# Patient Record
Sex: MALE | Race: WHITE | NOT HISPANIC OR LATINO | Employment: OTHER | URBAN - METROPOLITAN AREA
[De-identification: names, ages, dates, MRNs, and addresses within clinical notes are randomized per-mention and may not be internally consistent; named-entity substitution may affect disease eponyms.]

---

## 2017-01-18 DIAGNOSIS — F41.9 ANXIETY: Primary | ICD-10-CM

## 2017-01-18 RX ORDER — LORAZEPAM 1 MG/1
1 TABLET ORAL
Qty: 30 TABLET | Refills: 0 | Status: SHIPPED | OUTPATIENT
Start: 2017-01-18 | End: 2017-02-16

## 2017-01-18 NOTE — TELEPHONE ENCOUNTER
Hello,  last fill date:12-  Last quantity:30    Thank You,  Regi Avalos  Pharmacy Technician  [facility]  [phone]

## 2017-02-16 DIAGNOSIS — F41.9 ANXIETY: ICD-10-CM

## 2017-02-16 RX ORDER — LORAZEPAM 1 MG/1
1 TABLET ORAL
Qty: 30 TABLET | Refills: 0 | Status: CANCELLED | OUTPATIENT
Start: 2017-02-16

## 2017-02-16 NOTE — TELEPHONE ENCOUNTER
Hello,  last fill date:01-  Last quantity:30    Thank You,  Regi Avalos  Pharmacy Technician  [facility]  [phone]

## 2017-03-20 DIAGNOSIS — F41.9 ANXIETY: ICD-10-CM

## 2017-03-20 RX ORDER — LORAZEPAM 1 MG/1
1 TABLET ORAL
Qty: 30 TABLET | Refills: 0 | Status: SHIPPED | OUTPATIENT
Start: 2017-03-20 | End: 2017-04-17

## 2017-03-20 NOTE — TELEPHONE ENCOUNTER
Hello,  last fill date:02-17-17  Last quantity:30    Thank You,  Regi Avalos  Pharmacy Technician  [facility]  [phone]

## 2017-04-17 ENCOUNTER — TELEPHONE (OUTPATIENT)
Dept: FAMILY MEDICINE | Facility: CLINIC | Age: 62
End: 2017-04-17

## 2017-04-17 DIAGNOSIS — I25.10 CORONARY ARTERY DISEASE INVOLVING NATIVE CORONARY ARTERY OF NATIVE HEART WITHOUT ANGINA PECTORIS: Chronic | ICD-10-CM

## 2017-04-17 DIAGNOSIS — F41.9 ANXIETY: ICD-10-CM

## 2017-04-17 RX ORDER — LORAZEPAM 1 MG/1
1 TABLET ORAL
Qty: 30 TABLET | Refills: 0 | Status: SHIPPED | OUTPATIENT
Start: 2017-04-17 | End: 2017-05-22

## 2017-04-17 RX ORDER — METOPROLOL TARTRATE 25 MG/1
25 TABLET, FILM COATED ORAL 2 TIMES DAILY
Qty: 60 TABLET | Refills: 3 | Status: SHIPPED | OUTPATIENT
Start: 2017-04-17 | End: 2017-08-21

## 2017-04-17 NOTE — TELEPHONE ENCOUNTER
Inform that a clinic visit is due in May 2017 for a six month follow up and medication renewals.    Gerald Rowley MD  Please close encounter when call/work completed.

## 2017-04-17 NOTE — TELEPHONE ENCOUNTER
I have attempted to call the pt with the following message. I left message for pt to call back. I will call back another time. Please inform pt of message when call is returned. Justina Haas CMA ([facility])

## 2017-04-17 NOTE — TELEPHONE ENCOUNTER
Hello,  last fill date:03-  Last quantity:60    Thank You,  Regi Avalos  Pharmacy Technician  [facility]  [phone]

## 2017-04-17 NOTE — TELEPHONE ENCOUNTER
Hello,  last fill date:03-  Last quantity:30    Thank You,  Regi Avalos  Pharmacy Technician  [facility]  [phone]

## 2017-04-17 NOTE — PROGRESS NOTES
SUBJECTIVE:                                                    Jermaine Quiroga is a 62 year old male who presents to clinic today for the following health issues:    Hyperlipidemia Follow-Up      Rate your low fat/cholesterol diet?: fair    Taking statin?  Yes, no muscle aches from statin    Other lipid medications/supplements?:  none       Depression and Anxiety Follow-Up--    Previous episodes of depression have resolved as they were situational based on his severe injuries from an accident and septic endocarditis. He is back at work and depression. No longer a factor. He has some mild chronic anxiety for which he utilizes Lorazepam 1 mg at bedtime which helps control anxiety and allow sleep. He does not use more than 1 tablet per day. No antidepressants were needed. SSRI therapy is not currently being used.      Status since last visit: No change    Other associated symptoms:None    Complicating factors:     Significant life event: No     Current substance abuse: None    PHQ-9 SCORE 10/13/2016 11/30/2016 4/26/2017   Total Score - - -   Total Score 2 1 1     ISAURA-7 SCORE 10/13/2016 11/30/2016 4/26/2017   Total Score - - -   Total Score 0 0 0        PHQ-9  English      PHQ-9   Any Language     GAD7       Amount of exercise or physical activity: None    Problems taking medications regularly: No    Medication side effects: none    Diet: regular (no restrictions)      Vascular Disease Follow-up:  Coronary Artery Disease (CAD)  Stable without symptoms following his 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    Chest pain or pressure, left side neck or arm pain: No    Shortness of breath/increased sweats/nausea with exertion: No    Pain in calves walking 1-2 blocks: No    Worsened or new symptoms since last visit: No    Nitroglycerin use: no    Daily aspirin use: Yes     COPD Follow-Up--    Currently he is on an unknown inhaler under research study which has helped him greatly. He improves  his FEV1 significantly when tested. Previously was on Spiriva.    Symptoms are currently: stable    Current fatigue or dyspnea with ambulation: stable     Shortness of breath: stable    Increased or change in Cough/Sputum: No    Fever(s): No    Baseline ambulation without stopping to rest few blocks. Able to walk up one flights of stairs without stopping to rest.    Any ER/UC or hospital admissions since your last visit? No     History   Smoking Status     Former Smoker     Packs/day: 2.50     Years: 40.00     Types: Cigarettes     Quit date: 2/16/2008   Smokeless Tobacco     Never Used     Comment: heavy smoker for 40 years smoked 2.5 packs a day     Lab Results   Component Value Date    FEV1 1.74 10/30/2013    FEV1 1.60 10/30/2013    QZE8GCK 42% 10/30/2013    TXT0MNA 39 10/30/2013          Problem list and histories reviewed & adjusted, as indicated.  Additional history: as documented        Reviewed and updated as needed this visit by clinical staff  Tobacco  Allergies  Med Hx  Surg Hx  Fam Hx  Soc Hx      Reviewed and updated as needed this visit by Provider  Allergies         ROS:  C: NEGATIVE for fever, chills, change in weight  I: NEGATIVE for worrisome rashes, moles or lesions  INTEGUMENTARY/SKIN: Past history of psoriasis  E: NEGATIVE for vision changes or irritation  E/M: NEGATIVE for ear, mouth and throat problems  RESP:as above  B: NEGATIVE for masses, tenderness or discharge  CV: NEGATIVE for chest pain, palpitations or peripheral edema  GI: NEGATIVE for nausea, abdominal pain, heartburn, or change in bowel habits  : NEGATIVE for frequency, dysuria, or hematuria  MUSCULOSKELETAL: Ongoing chronic pain in his previous total knee arthroplasty. Continued pain in his right ankle post fracture trauma from the accident. He wears a brace.  N: NEGATIVE for weakness, dizziness or paresthesias  E: NEGATIVE for temperature intolerance, skin/hair changes  H: NEGATIVE for bleeding problems  P: NEGATIVE for  "changes in mood or affect  PSYCHIATRIC: Reasonable benefit and control of insomnia related to anxiety with the use of Lorazepam 1 mg at bedtime.    Urine drug screen in November 2016 showed some presence of THC. Patient had ingested some marijuana containing food at [facility]. Denies daily use of drugs.    OBJECTIVE:                                                    /82 (BP Location: Left arm, Patient Position: Chair, Cuff Size: Adult Large)  Pulse 114  Temp 99.6  F (37.6  C) (Temporal)  Resp 20  Ht 5' 7.5\" (1.715 m)  Wt 220 lb 4.8 oz (99.9 kg)  SpO2 96%  BMI 33.99 kg/m2  Body mass index is 33.99 kg/(m^2).  GENERAL: alert, no distress, cooperative and over weight  EYES: Eyes grossly normal to inspection, extraocular movements - intact, and PERRL  HENT: ear canals- normal; TMs- normal; Nose- normal; Mouth- no ulcers, no lesions  NECK: no tenderness, no adenopathy, no asymmetry, no masses, no stiffness; thyroid- normal to palpation  RESP: lungs clear to auscultation - no rales, no rhonchi, no wheezes  CV: regular rates and rhythm, normal S1 S2, no S3 or S4 and no murmur, no click or rub -  ABDOMEN: soft, no tenderness, no  hepatosplenomegaly, no masses, normal bowel sounds  MS: Total left knee arthroplasty. No edema of the lower extremities  SKIN: no suspicious lesions, no rashes  NEURO: strength and tone- normal, sensory exam- grossly normal, mentation- intact, speech- normal, reflexes- symmetric  BACK: no CVA tenderness, no paralumbar tenderness  PSYCH: Alert and oriented times 3; speech- coherent , normal rate and volume; able to articulate logical thoughts, able to abstract reason, no tangential thoughts, no hallucinations or delusions, affect- normal  LYMPHATICS: ant. cervical- normal, post. cervical- normal, axillary- normal, supraclavicular- normal, inguinal- normal    Diagnostic test results:  Diagnostic Test Results:  none      ASSESSMENT/PLAN:                                                  "   1. Chronic obstructive pulmonary disease, unspecified COPD type (H)   Stable at present. He is continued with inhalers through the research study he is involved in.  - COPD ACTION PLAN    2. Anxiety   Renew lorazepam 1 mg at bedtime on a monthly basis in follow-up in 6 months.    3. Coronary artery disease involving native coronary artery of native heart without angina pectoris   Stable with no recurrent symptoms    4. LEXY-Moderately Severe (AHI 25; LSat 68%)    Continues on current therapy    5. Hyperlipidemia LDL goal <100   Continues on statin therapy.      Follow up with Provider - Follow-up in 6 months or as needed.   See Patient Instructions    The patient understood the rational for the diagnosis and treatment plan. All questions were answered to best of my ability and the patient's satisfaction.      Gerald Rowley MD  [facility]

## 2017-04-17 NOTE — TELEPHONE ENCOUNTER
Lorazepam refilled today--    Needing a clinic visit in one month for six month follow up.    Gerald Rowley MD

## 2017-04-26 ENCOUNTER — OFFICE VISIT (OUTPATIENT)
Dept: FAMILY MEDICINE | Facility: CLINIC | Age: 62
End: 2017-04-26
Payer: COMMERCIAL

## 2017-04-26 VITALS
BODY MASS INDEX: 33.39 KG/M2 | HEART RATE: 114 BPM | HEIGHT: 68 IN | DIASTOLIC BLOOD PRESSURE: 82 MMHG | SYSTOLIC BLOOD PRESSURE: 138 MMHG | TEMPERATURE: 99.6 F | WEIGHT: 220.3 LBS | RESPIRATION RATE: 20 BRPM | OXYGEN SATURATION: 96 %

## 2017-04-26 DIAGNOSIS — I25.10 CORONARY ARTERY DISEASE INVOLVING NATIVE CORONARY ARTERY OF NATIVE HEART WITHOUT ANGINA PECTORIS: Chronic | ICD-10-CM

## 2017-04-26 DIAGNOSIS — E78.5 HYPERLIPIDEMIA LDL GOAL <100: ICD-10-CM

## 2017-04-26 DIAGNOSIS — J44.9 CHRONIC OBSTRUCTIVE PULMONARY DISEASE, UNSPECIFIED COPD TYPE (H): Primary | Chronic | ICD-10-CM

## 2017-04-26 DIAGNOSIS — G47.33 OSA (OBSTRUCTIVE SLEEP APNEA): Chronic | ICD-10-CM

## 2017-04-26 DIAGNOSIS — F41.9 ANXIETY: ICD-10-CM

## 2017-04-26 PROCEDURE — 99214 OFFICE O/P EST MOD 30 MIN: CPT | Performed by: FAMILY MEDICINE

## 2017-04-26 ASSESSMENT — PATIENT HEALTH QUESTIONNAIRE - PHQ9: 5. POOR APPETITE OR OVEREATING: NOT AT ALL

## 2017-04-26 ASSESSMENT — ANXIETY QUESTIONNAIRES
IF YOU CHECKED OFF ANY PROBLEMS ON THIS QUESTIONNAIRE, HOW DIFFICULT HAVE THESE PROBLEMS MADE IT FOR YOU TO DO YOUR WORK, TAKE CARE OF THINGS AT HOME, OR GET ALONG WITH OTHER PEOPLE: NOT DIFFICULT AT ALL
5. BEING SO RESTLESS THAT IT IS HARD TO SIT STILL: NOT AT ALL
2. NOT BEING ABLE TO STOP OR CONTROL WORRYING: NOT AT ALL
1. FEELING NERVOUS, ANXIOUS, OR ON EDGE: NOT AT ALL
3. WORRYING TOO MUCH ABOUT DIFFERENT THINGS: NOT AT ALL
7. FEELING AFRAID AS IF SOMETHING AWFUL MIGHT HAPPEN: NOT AT ALL
GAD7 TOTAL SCORE: 0
6. BECOMING EASILY ANNOYED OR IRRITABLE: NOT AT ALL

## 2017-04-26 ASSESSMENT — PAIN SCALES - GENERAL: PAINLEVEL: MODERATE PAIN (5)

## 2017-04-26 NOTE — NURSING NOTE
"Chief Complaint   Patient presents with     Recheck Medication     Panel Management     Mychart, ISAURA, COPD AP, PHQ,       Initial /82 (BP Location: Left arm, Patient Position: Chair, Cuff Size: Adult Large)  Pulse 114  Temp 99.6  F (37.6  C) (Temporal)  Resp 20  Ht 5' 7.5\" (1.715 m)  Wt 220 lb 4.8 oz (99.9 kg)  SpO2 96%  BMI 33.99 kg/m2 Estimated body mass index is 33.99 kg/(m^2) as calculated from the following:    Height as of this encounter: 5' 7.5\" (1.715 m).    Weight as of this encounter: 220 lb 4.8 oz (99.9 kg).  Medication Reconciliation: complete  Justina Haas CMA ([facility])    "

## 2017-04-26 NOTE — LETTER
My COPD Action Plan   Name: Jermaine Quiroga    YOB: 1955   Date: 4/26/2017    My doctor: Gerald Rowley MD   My clinic: [address], [address]  [place] [id number] [phone]  My Controller Medicine: Study medication --Spiriva in the past   Dose:      My Rescue Medicine: Albuterol (Proair/Ventolin/Proventil) inhaler   Dose: As needed every 4 hours     My Flare Up Medicine: Prednisone   Dose: As directed  My COPD Severity: Moderate = FeV1 < 79% -50%     Use of Oxygen: Oxygen Not Prescribed         GREEN ZONE       Doing well today      Usual level of activity and exercise    Usual amount of cough and mucus    No shortness of breath    Usual level of health (thinking clearly, sleeping well, feel like eating) Actions:      Take daily medicines    Use oxygen as prescribed    Follow regular exercise and diet plan    Avoid cigarette smoke and other irritants that harm the lungs           YELLOW ZONE          Having a bad day or flare up      Short of breath more than usual    A lot more sputum (mucus) than usual    Sputum looks yellow, green, tan, brown or bloody    More coughing or wheezing    Fever or chills    Less energy; trouble completing activities    Trouble thinking or focusing    Using quick relief inhaler or nebulizer more often    Poor sleep; symptoms wake me up    Do not feel like eating Actions:      Get plenty of rest    Take daily medicines    Use quick relief inhaler every 4 hours    If you use oxygen, call you doctor to see if you should adjust your oxygen    Do breathing exercises or other things to help you relax    Let a loved one, friend or neighbor know you are feeling worse    Call your care team if you have 2 or more symptoms.  Start taking steroids or antibiotics if directed by your care team           RED ZONE       Need medical care now      Severe shortness of breath (feel you can't breathe)    Fever, chills    Not enough breath  to do any activity    Trouble coughing up mucus, walking or talking    Blood in mucus    Frequent coughing   Rescue medicines are not working    Not able to sleep because of breathing    Feel confused or drowsy    Chest pain    Actions:      Call your health care team.  If you cannot reach your care team, call 911 or go to the emergency room.        Electronically signed by: Gerald Rowley, April 26, 2017  Annual Reminders:  Meet with Care Team, Flu Shot every Fall and Pneumonia Shot at least once  Pharmacy:    [facility] [place] - [address] [place] [address] [facility] - [place], [place] - [address], [id number]

## 2017-04-26 NOTE — PATIENT INSTRUCTIONS
These are new changes to your current plan of care based on today's visit:    Medications stopped    Medications to be started    Change dose of this medication None   New treatments        Follow up appointments:    1.  FOLLOW UP WITH YOUR PRIMARY CARE PROVIDER: 6 month(s) for clinic visit    Gerald Rowley MD

## 2017-04-26 NOTE — MR AVS SNAPSHOT
After Visit Summary   4/26/2017    Jermaine Quiroga    MRN: [id number]           Patient Information     Date Of Birth          1955        Visit Information        Provider Department      4/26/2017 3:20 PM Gerald Rowley MD [facility]        Today's Diagnoses     Chronic obstructive pulmonary disease, unspecified COPD type (H)    -  1    Anxiety        Coronary artery disease involving native coronary artery of native heart without angina pectoris        LEXY-Moderately Severe (AHI 25; LSat 68%)        Hyperlipidemia LDL goal <100          Care Instructions    These are new changes to your current plan of care based on today's visit:    Medications stopped    Medications to be started    Change dose of this medication None   New treatments        Follow up appointments:    1.  FOLLOW UP WITH YOUR PRIMARY CARE PROVIDER: 6 month(s) for clinic visit    Gerald Rowley MD              Follow-ups after your visit        Follow-up notes from your care team     Return in about 6 months (around 10/26/2017) for Routine Visit.      Who to contact     If you have questions or need follow up information about today's clinic visit or your schedule please contact [facility] directly at [phone].  Normal or non-critical lab and imaging results will be communicated to you by [facility], letter or phone within 4 business days after the clinic has received the results. If you do not hear from us within 7 days, please contact the clinic through [facility] or phone. If you have a critical or abnormal lab result, we will notify you by phone as soon as possible.  Submit refill requests through [facility] or call your pharmacy and they will forward the refill request to us. Please allow 3 business days for your refill to be completed.          Additional Information About Your Visit        [facility] Information     [facility] lets you send messages to your doctor, view your test results, renew  "your prescriptions, schedule appointments and more. To sign up, go to [email or web address] . Click on \"Log in\" on the left side of the screen, which will take you to the Welcome page. Then click on \"Sign up Now\" on the right side of the page.     You will be asked to enter the access code listed below, as well as some personal information. Please follow the directions to create your username and password.     Your access code is: [email or web address]  Expires: 2017  3:42 PM     Your access code will  in 90 days. If you need help or a new code, please call your [facility] clinic or [phone].        Care EveryWhere ID     This is your Care EveryWhere ID. This could be used by other organizations to access your [facility] medical records  [id number]        Your Vitals Were     Pulse Temperature Respirations Height Pulse Oximetry BMI (Body Mass Index)    114 99.6  F (37.6  C) (Temporal) 20 5' 7.5\" (1.715 m) 96% 33.99 kg/m2       Blood Pressure from Last 3 Encounters:   17 138/82   16 130/86   16 135/75    Weight from Last 3 Encounters:   17 220 lb 4.8 oz (99.9 kg)   16 211 lb (95.7 kg)   16 213 lb 8 oz (96.8 kg)              We Performed the Following     COPD ACTION PLAN        Primary Care Provider Office Phone # Fax #    Gerald Rowley [phone]       [facility] [address] [facility] [address]        Thank you!     Thank you for choosing [facility]  for your care. Our goal is always to provide you with excellent care. Hearing back from our patients is one way we can continue to improve our services. Please take a few minutes to complete the written survey that you may receive in the mail after your visit with us. Thank you!             Your Updated Medication List - Protect others around you: Learn how to safely use, store and throw away your medicines at www.disposemymeds.org.          This list is accurate as of: " 4/26/17  3:42 PM.  Always use your most recent med list.                   Brand Name Dispense Instructions for use    albuterol 108 (90 BASE) MCG/ACT Inhaler    PROAIR HFA/PROVENTIL HFA/VENTOLIN HFA    1 Inhaler    Inhale 2 puffs into the lungs every 4 hours as needed for shortness of breath / dyspnea       BABY ASPIRIN PO          LORazepam 1 MG tablet    ATIVAN    30 tablet    Take 1 tablet (1 mg) by mouth nightly as needed for anxiety       metoprolol 25 MG tablet    LOPRESSOR    60 tablet    Take 1 tablet (25 mg) by mouth 2 times daily       order for DME      Auto-CPAP: Max 10 cm H2O Min 10 cm H2O  Continuous  Lifetime need and heated humidity.   Ramp set to patient's preference. Mask of choice.       simvastatin 20 MG tablet    ZOCOR    90 tablet    Take 1 tablet (20 mg) by mouth At Bedtime       tiotropium 18 MCG capsule    SPIRIVA HANDIHALER    30 capsule    Inhale contents of one capsule daily.       VITAMIN D3 PO      Take 1,000 Units by mouth daily

## 2017-04-27 ASSESSMENT — ANXIETY QUESTIONNAIRES: GAD7 TOTAL SCORE: 0

## 2017-04-27 ASSESSMENT — PATIENT HEALTH QUESTIONNAIRE - PHQ9: SUM OF ALL RESPONSES TO PHQ QUESTIONS 1-9: 1

## 2017-05-22 DIAGNOSIS — F41.9 ANXIETY: ICD-10-CM

## 2017-05-22 NOTE — TELEPHONE ENCOUNTER
Hello,  last fill date:04-  Last quantity:30    Thank You,  Regi Avalos  Pharmacy Technician  [facility]  [phone]

## 2017-05-23 RX ORDER — LORAZEPAM 1 MG/1
1 TABLET ORAL
Qty: 30 TABLET | Refills: 0 | Status: SHIPPED | OUTPATIENT
Start: 2017-05-23 | End: 2017-06-19

## 2017-06-19 DIAGNOSIS — F41.9 ANXIETY: ICD-10-CM

## 2017-06-19 RX ORDER — LORAZEPAM 1 MG/1
1 TABLET ORAL
Qty: 30 TABLET | Refills: 0 | Status: SHIPPED | OUTPATIENT
Start: 2017-06-19 | End: 2017-07-19

## 2017-06-19 NOTE — TELEPHONE ENCOUNTER
Hello,  last fill date:05-  Last quantity:30    Thank You,  Regi Avalos  Pharmacy Technician  [facility]  [phone]

## 2017-07-19 DIAGNOSIS — F41.9 ANXIETY: ICD-10-CM

## 2017-07-19 RX ORDER — LORAZEPAM 1 MG/1
1 TABLET ORAL
Qty: 30 TABLET | Refills: 0 | Status: SHIPPED | OUTPATIENT
Start: 2017-07-19 | End: 2017-08-21

## 2017-07-19 NOTE — TELEPHONE ENCOUNTER
Hello,  last fill date:06-  Last quantity:30    Thank You,  Regi Avalos  Pharmacy Technician  [facility]  [phone]

## 2017-08-21 DIAGNOSIS — I25.10 CORONARY ARTERY DISEASE INVOLVING NATIVE CORONARY ARTERY OF NATIVE HEART WITHOUT ANGINA PECTORIS: Chronic | ICD-10-CM

## 2017-08-21 DIAGNOSIS — F41.9 ANXIETY: ICD-10-CM

## 2017-08-21 RX ORDER — METOPROLOL TARTRATE 25 MG/1
25 TABLET, FILM COATED ORAL 2 TIMES DAILY
Qty: 60 TABLET | Refills: 3 | Status: SHIPPED | OUTPATIENT
Start: 2017-08-21 | End: 2017-12-19

## 2017-08-21 RX ORDER — LORAZEPAM 1 MG/1
1 TABLET ORAL
Qty: 30 TABLET | Refills: 0 | Status: SHIPPED | OUTPATIENT
Start: 2017-08-21 | End: 2017-09-18

## 2017-08-21 NOTE — TELEPHONE ENCOUNTER
Hello,  last fill date:07-  Last quantity:60    Thank You,  Regi Avalos  Pharmacy Technician  [facility]  [phone]

## 2017-09-11 DIAGNOSIS — I25.10 CORONARY ARTERY DISEASE INVOLVING NATIVE CORONARY ARTERY OF NATIVE HEART WITHOUT ANGINA PECTORIS: ICD-10-CM

## 2017-09-11 RX ORDER — SIMVASTATIN 20 MG
20 TABLET ORAL AT BEDTIME
Qty: 90 TABLET | Refills: 4 | Status: SHIPPED | OUTPATIENT
Start: 2017-09-11

## 2017-09-11 NOTE — TELEPHONE ENCOUNTER
Hello,  last fill date:06-  Last quantity:90    Thank You,  Regi Avalos  Pharmacy Technician  [facility]  [phone]

## 2017-09-18 DIAGNOSIS — F41.9 ANXIETY: ICD-10-CM

## 2017-09-18 RX ORDER — LORAZEPAM 1 MG/1
1 TABLET ORAL
Qty: 30 TABLET | Refills: 0 | Status: SHIPPED | OUTPATIENT
Start: 2017-09-18 | End: 2017-10-19

## 2017-09-18 NOTE — TELEPHONE ENCOUNTER
Hello,  last fill date:08-  Last quantity:30    Thank You,  Regi Avalos  Pharmacy Technician  [facility]  [phone]

## 2017-10-19 DIAGNOSIS — F41.9 ANXIETY: ICD-10-CM

## 2017-10-19 RX ORDER — LORAZEPAM 1 MG/1
1 TABLET ORAL
Qty: 30 TABLET | Refills: 0 | Status: SHIPPED | OUTPATIENT
Start: 2017-10-19 | End: 2017-11-20

## 2017-10-19 NOTE — TELEPHONE ENCOUNTER
Hello,  last fill date:09-  Last quantity:30    Thank You,  Regi Avalos  Pharmacy Technician  [facility]  [phone]

## 2017-11-20 DIAGNOSIS — F41.9 ANXIETY: ICD-10-CM

## 2017-11-20 RX ORDER — LORAZEPAM 1 MG/1
1 TABLET ORAL
Qty: 30 TABLET | Refills: 0 | Status: SHIPPED | OUTPATIENT
Start: 2017-11-20 | End: 2017-12-18

## 2017-11-20 NOTE — TELEPHONE ENCOUNTER
Hello,  last fill date:10-  Last quantity:30    Thank You,  Regi Avalos  Pharmacy Technician  [facility]  [phone]

## 2017-11-20 NOTE — TELEPHONE ENCOUNTER
Please advise this patient that his Lorazepam was refilled 1 time only. He is overdue for his six-month follow-up. This was due in October 2017. Needs to schedule for any further refills.    Assist in scheduling as needed.    Gerald Rowley MD  Please close encounter when call/work completed.

## 2017-11-24 NOTE — PROGRESS NOTES
"  SUBJECTIVE:                                                    Jermaine Quiroga is a 62 year old male who presents to clinic today for the following health issues:      History of Present Illness     COPD:     Current status of COPD symptom::  Stable    Status of fatigue and dyspnea with ambulation::  Stable    Status of dyspnea::  Stable    Increase or change in cough or sputum::  No (had a cold couple weeks ago )    Fever::  No    Baseline ambulation without stopping to rest::  Less than 1 block    Number of flights of stairs without resting::  >5    ER or UC Visits or Hospital admissions::  None    Hyperlipidemia:     Low fat/chol diet rating::  Fair    Taking Statins::  YES    Side effects from hypolipidemia medication::  Possible muscle aches from Statin    Lipid Medications or Supplements::  None    Diet:  Other  Frequency of exercise:  None  Taking medications regularly:  Yes  Medication side effects:  Not applicable  Additional concerns today:  No      Chronic Pain Follow-Up       Type / Location of Pain: Ongoing knee pain following replacement therapy. Also some right shoulder discomfort chronically due to previous accidents. He is not on any opioid therapy or pain medications.  Analgesia/pain control:       Recent changes:  same      Overall control: \"not taking anything now \"   Activity level/function:      Daily activities:  Can do most things most days, with some rest    Work:  Part time   Adverse effects:  none  Adherence    Taking medication as directed?  Yes    Participating in other treatments: not applicable  Risk Factors:    Sleep:  Poor    Mood/anxiety:  controlled    Recent family or social stressors:  none noted    Other aggravating factors: walking, getting up and down   PHQ-9 SCORE 10/13/2016 11/30/2016 4/26/2017   Total Score - - -   Total Score 2 1 1     ISAURA-7 SCORE 10/13/2016 11/30/2016 4/26/2017   Total Score - - -   Total Score 0 0 0     Encounter-Level CSA - 06/14/2016:          " Controlled Substance Agreement - Scan on 6/27/2016 12:53 PM : CONTROLLED SUBSTANCE AGREEMENT (below)                  Anxiety Follow-Up-- utilizing Lorazepam 1 mg at bedtime to relieve anxiety and to assist in sleep. Has been stable in its use.    Status since last visit: No change    Other associated symptoms:None    Complicating factors:   Significant life event: No   Current substance abuse: None  Depression symptoms: No  ISAURA-7 SCORE 10/13/2016 11/30/2016 4/26/2017   Total Score - - -   Total Score 0 0 0       GAD7                  Problem list and histories reviewed & adjusted, as indicated.  Additional history: as documented            ROS:  C: NEGATIVE for fever, chills, change in weight  I: NEGATIVE for worrisome rashes, moles or lesions  E: NEGATIVE for vision changes or irritation  E/M: NEGATIVE for ear, mouth and throat problems  RESP:as above and continues on a study for COPD medications. Has noted some slight diminution in respiratory reserve and respiratory capacity. Appears to be mainly over time. No unusual cough. No hemoptysis. Is not smoking.  B: NEGATIVE for masses, tenderness or discharge  CV: NEGATIVE for chest pain, palpitations or peripheral edema  CV: Past history of mild myocardial infarction at the time that he was in critical condition with multiple pulmonary emboli post accident. Currently stable with no recurrent angina.  GI: NEGATIVE for nausea, abdominal pain, heartburn, or change in bowel habits  GI: No changes. Is due for colonoscopy at this time for 5 year follow-up. In 2012 his colonoscopy was clear. He will likely postpone this until he is on Medicare early in 2018.  : NEGATIVE for frequency, dysuria, or hematuria  MUSCULOSKELETAL: Joint pain and limitations, especially involving his left knee and right shoulder. Continues to work.  N: NEGATIVE for weakness, dizziness or paresthesias  E: NEGATIVE for temperature intolerance, skin/hair changes  H: NEGATIVE for bleeding  "problems  P: NEGATIVE for changes in mood or affect  PSYCHIATRIC: Stable anxiety - only need is for minor medications at night to help sleep.    OBJECTIVE:                                                    /58  Pulse 80  Temp 98.7  F (37.1  C) (Oral)  Resp 16  Ht 5' 6\" (1.676 m)  Wt 225 lb (102.1 kg)  SpO2 95%  BMI 36.32 kg/m2  Body mass index is 36.32 kg/(m^2).  GENERAL: alert, no distress and cooperative  EYES: Eyes grossly normal to inspection, extraocular movements - intact, and PERRL  HENT: ear canals- normal; TMs- normal; Nose- normal; Mouth- no ulcers, no lesions  NECK: no tenderness, no adenopathy, no asymmetry, no masses, no stiffness; thyroid- normal to palpation  NECK: No carotid bruits  RESP: lungs clear to auscultation - no rales, no rhonchi, no wheezes  RESP: Some diminished breath sounds throughout but otherwise clear  CV: regular rates and rhythm, normal S1 S2, no S3 or S4 and no murmur, no click or rub -  CV: Noting occasional premature or dropped beat. Otherwise appears to have a regular rate and rhythm. No murmurs.  ABDOMEN: soft, no tenderness, no  hepatosplenomegaly, no masses, normal bowel sounds  MS: extremities- no gross deformities noted, no edema  SKIN: no suspicious lesions, no rashes  NEURO: strength and tone- normal, sensory exam- grossly normal, mentation- intact, speech- normal, reflexes- symmetric  BACK: no CVA tenderness, no paralumbar tenderness  - male: testicles- normal, no atrophy, no masses;  no inguinal hernias  Rectal- male: prostate symmetric w/o nodularity, no masses palpated and prostate 1+  PSYCH: Alert and oriented times 3; speech- coherent , normal rate and volume; able to articulate logical thoughts, able to abstract reason, no tangential thoughts, no hallucinations or delusions, affect- normal  LYMPHATICS: ant. cervical- normal, post. cervical- normal, axillary- normal, supraclavicular- normal, inguinal- normal    Diagnostic test results:  Diagnostic " Test Results:  Blood work pending, drawn 4 hours after eating.       ASSESSMENT/PLAN:                                                    1. Anxiety   Stable with current use of Lorazepam 1 mg daily at night for sleep and anxiety. Will continue to renew monthly and follow-up every 6 months. Patient's drug screens in the past been negative.    2. Hyperlipidemia LDL goal <100  Continue on statin therapy for secondary prevention of myocardial disease.  - Comprehensive metabolic panel  - Lipid panel reflex to direct LDL Fasting    3. Coronary artery disease involving native coronary artery of native heart without angina pectoris  Stable. Findings were with a minor myocardial infarction associated with significant posttraumatic pulmonary emboli. Currently stable with no recurrent symptoms.  - Comprehensive metabolic panel  - Lipid panel reflex to direct LDL Fasting    4. Chronic obstructive pulmonary disease, unspecified COPD type (H)  Stable. Continues on inhaler on type through medication study.  - CBC with platelets    5. Special screening for malignant neoplasm of prostate  Exam was benign. PSA pending.  - Prostate spec antigen screen      Follow up with Provider - Follow-up in 6 months or as needed. Received influenza vaccination at the [facility].   See Patient Instructions    The patient understood the rational for the diagnosis and treatment plan. All questions were answered to best of my ability and the patient's satisfaction.    Note: Chart documentation done in part with Dragon Voice Recognition software. Although reviewed after completion, some word and grammatical errors may remain.  Please consider this when interpreting information in this chart.      Gerald Rowley MD  [facility]

## 2017-11-29 ENCOUNTER — OFFICE VISIT (OUTPATIENT)
Dept: FAMILY MEDICINE | Facility: CLINIC | Age: 62
End: 2017-11-29
Payer: COMMERCIAL

## 2017-11-29 VITALS
DIASTOLIC BLOOD PRESSURE: 58 MMHG | RESPIRATION RATE: 16 BRPM | HEIGHT: 66 IN | SYSTOLIC BLOOD PRESSURE: 110 MMHG | BODY MASS INDEX: 36.16 KG/M2 | TEMPERATURE: 98.7 F | HEART RATE: 80 BPM | OXYGEN SATURATION: 95 % | WEIGHT: 225 LBS

## 2017-11-29 DIAGNOSIS — I25.10 CORONARY ARTERY DISEASE INVOLVING NATIVE CORONARY ARTERY OF NATIVE HEART WITHOUT ANGINA PECTORIS: Chronic | ICD-10-CM

## 2017-11-29 DIAGNOSIS — Z12.5 SPECIAL SCREENING FOR MALIGNANT NEOPLASM OF PROSTATE: ICD-10-CM

## 2017-11-29 DIAGNOSIS — J44.9 CHRONIC OBSTRUCTIVE PULMONARY DISEASE, UNSPECIFIED COPD TYPE (H): Chronic | ICD-10-CM

## 2017-11-29 DIAGNOSIS — R94.4 DECREASED GFR: ICD-10-CM

## 2017-11-29 DIAGNOSIS — F41.9 ANXIETY: Primary | ICD-10-CM

## 2017-11-29 DIAGNOSIS — E78.5 HYPERLIPIDEMIA LDL GOAL <100: ICD-10-CM

## 2017-11-29 LAB
ERYTHROCYTE [DISTWIDTH] IN BLOOD BY AUTOMATED COUNT: 13.6 % (ref 10–15)
HCT VFR BLD AUTO: 46.3 % (ref 40–53)
HGB BLD-MCNC: 15.6 G/DL (ref 13.3–17.7)
MCH RBC QN AUTO: 31.2 PG (ref 26.5–33)
MCHC RBC AUTO-ENTMCNC: 33.7 G/DL (ref 31.5–36.5)
MCV RBC AUTO: 93 FL (ref 78–100)
PLATELET # BLD AUTO: 256 10E9/L (ref 150–450)
RBC # BLD AUTO: 5 10E12/L (ref 4.4–5.9)
WBC # BLD AUTO: 7.3 10E9/L (ref 4–11)

## 2017-11-29 PROCEDURE — 80061 LIPID PANEL: CPT | Performed by: FAMILY MEDICINE

## 2017-11-29 PROCEDURE — G0103 PSA SCREENING: HCPCS | Performed by: FAMILY MEDICINE

## 2017-11-29 PROCEDURE — 80053 COMPREHEN METABOLIC PANEL: CPT | Performed by: FAMILY MEDICINE

## 2017-11-29 PROCEDURE — 85027 COMPLETE CBC AUTOMATED: CPT | Performed by: FAMILY MEDICINE

## 2017-11-29 PROCEDURE — 99214 OFFICE O/P EST MOD 30 MIN: CPT | Performed by: FAMILY MEDICINE

## 2017-11-29 PROCEDURE — 36415 COLL VENOUS BLD VENIPUNCTURE: CPT | Performed by: FAMILY MEDICINE

## 2017-11-29 ASSESSMENT — PAIN SCALES - GENERAL: PAINLEVEL: NO PAIN (0)

## 2017-11-29 NOTE — LETTER
November 30, 2017      Jermaine ARMAS Junaid  [address] [address]        Dear Siddhartha,    Syd is a copy of the laboratory values from yesterday. Results as follows:    1. Stable PSA with no suggestion of prostate cancer  2. The metabolic panel shows no evidence of diabetes or liver disease. Kidney function seemed to change slightly over the past year. The GFR (measure of filtration) is just slightly below normal at 60 with a significant change from last year. This could be because of fasting and maybe being slightly dehydrated at the end of the afternoon when the blood studies were drawn. I would like to have you recheck this at your convenience when you are not fasting and well hydrated. This can be done at [facility] when you  a prescription. An order will be in place for you to have a basic metabolic panel done.  3. Cholesterol panel appears normal except for a slight rise in triglycerides. This can be helped the diet that is lower in carbohydrates and fats. Will recheck this in 6 months.  4. CBC is normal showing no anemia.    I will look for the basic metabolic profile in the next month. Follow-up in 6 months as discussed.Please call with any questions or concerns and thank you for your confidence.      Sincerely,        Gerald Rowley MD

## 2017-11-29 NOTE — MR AVS SNAPSHOT
After Visit Summary   11/29/2017    Jermaine Quiroga    MRN: [id number]           Patient Information     Date Of Birth          1955        Visit Information        Provider Department      11/29/2017 3:20 PM Gerald Rowley MD [facility]        Today's Diagnoses     Anxiety    -  1    Hyperlipidemia LDL goal <100        Coronary artery disease involving native coronary artery of native heart without angina pectoris        Chronic obstructive pulmonary disease, unspecified COPD type (H)        Special screening for malignant neoplasm of prostate          Care Instructions    These are new changes to your current plan of care based on today's visit:    Medications stopped    Medications to be started    Change dose of this medication None   New treatments        Follow up appointments:    1.  FOLLOW UP WITH YOUR PRIMARY CARE PROVIDER: 6 month(s) for clinic visit with fasting blood work    2. FOLLOW UP WITH SPECIALIST:     3. FOLLOW UP WITH NURSE VISIT:     4. IMAGING STUDIES TO BE SCHEDULED:     5. PROCEDURES TO BE SCHEDULED: Colonoscopy due early in 2018 after on Medicare    Gerald Rowley MD                Follow-ups after your visit        Follow-up notes from your care team     Return in about 6 months (around 5/29/2018) for Routine Visit, Lab Work.      Who to contact     If you have questions or need follow up information about today's clinic visit or your schedule please contact [facility] directly at [phone].  Normal or non-critical lab and imaging results will be communicated to you by MyChart, letter or phone within 4 business days after the clinic has received the results. If you do not hear from us within 7 days, please contact the clinic through ideaForgehart or phone. If you have a critical or abnormal lab result, we will notify you by phone as soon as possible.  Submit refill requests through Awdio or call your pharmacy and they will forward the  "refill request to us. Please allow 3 business days for your refill to be completed.          Additional Information About Your Visit        MyChart Information     Featurespace lets you send messages to your doctor, view your test results, renew your prescriptions, schedule appointments and more. To sign up, go to [email or web address] . Click on \"Log in\" on the left side of the screen, which will take you to the Welcome page. Then click on \"Sign up Now\" on the right side of the page.     You will be asked to enter the access code listed below, as well as some personal information. Please follow the directions to create your username and password.     Your access code is: [email or web address]  Expires: 2018  3:54 PM     Your access code will  in 90 days. If you need help or a new code, please call your [facility] clinic or [phone].        Care EveryWhere ID     This is your Care EveryWhere ID. This could be used by other organizations to access your [facility] medical records  [id number]        Your Vitals Were     Pulse Temperature Respirations Height Pulse Oximetry BMI (Body Mass Index)    80 98.7  F (37.1  C) (Oral) 16 5' 6\" (1.676 m) 95% 36.32 kg/m2       Blood Pressure from Last 3 Encounters:   17 110/58   17 138/82   16 130/86    Weight from Last 3 Encounters:   17 225 lb (102.1 kg)   17 220 lb 4.8 oz (99.9 kg)   16 211 lb (95.7 kg)              We Performed the Following     CBC with platelets     Comprehensive metabolic panel     Lipid panel reflex to direct LDL Fasting     Prostate spec antigen screen        Primary Care Provider Office Phone # Fax #    Gerald Rowley [phone] [address] [place] [address]        Equal Access to Services     [facility] CenterEVERETTE : Isac Jeronimo, wajayce luqeleanor, qaybta kaalleah henderson . [facility] [phone].    ATENCIÓN: Si billie valladares " a ramsey disposición servicios gratuitos de asistencia lingüística. Anisha francis [phone].    We comply with applicable federal civil rights laws and [place] laws. We do not discriminate on the basis of race, color, national origin, age, disability, sex, sexual orientation, or gender identity.            Thank you!     Thank you for choosing [facility]  for your care. Our goal is always to provide you with excellent care. Hearing back from our patients is one way we can continue to improve our services. Please take a few minutes to complete the written survey that you may receive in the mail after your visit with us. Thank you!             Your Updated Medication List - Protect others around you: Learn how to safely use, store and throw away your medicines at www.disposemymeds.org.          This list is accurate as of: 11/29/17  3:54 PM.  Always use your most recent med list.                   Brand Name Dispense Instructions for use Diagnosis    albuterol 108 (90 BASE) MCG/ACT Inhaler    PROAIR HFA/PROVENTIL HFA/VENTOLIN HFA    1 Inhaler    Inhale 2 puffs into the lungs every 4 hours as needed for shortness of breath / dyspnea    Chronic obstructive pulmonary disease, unspecified COPD type (H)       BABY ASPIRIN PO           LORazepam 1 MG tablet    ATIVAN    30 tablet    Take 1 tablet (1 mg) by mouth nightly as needed for anxiety Due for Office visit.    Anxiety       metoprolol 25 MG tablet    LOPRESSOR    60 tablet    Take 1 tablet (25 mg) by mouth 2 times daily    Coronary artery disease involving native coronary artery of native heart without angina pectoris       order for DME      Auto-CPAP: Max 10 cm H2O Min 10 cm H2O  Continuous  Lifetime need and heated humidity.   Ramp set to patient's preference. Mask of choice.    LEXY (obstructive sleep apnea)       simvastatin 20 MG tablet    ZOCOR    90 tablet    Take 1 tablet (20 mg) by mouth At Bedtime    Coronary artery disease involving native coronary  artery of native heart without angina pectoris       tiotropium 18 MCG capsule    SPIRIVA HANDIHALER    30 capsule    Inhale contents of one capsule daily.    Chronic obstructive pulmonary disease, unspecified COPD type (H)       VITAMIN D3 PO      Take 1,000 Units by mouth daily

## 2017-11-29 NOTE — PATIENT INSTRUCTIONS
These are new changes to your current plan of care based on today's visit:    Medications stopped    Medications to be started    Change dose of this medication None   New treatments        Follow up appointments:    1.  FOLLOW UP WITH YOUR PRIMARY CARE PROVIDER: 6 month(s) for clinic visit with fasting blood work    2. FOLLOW UP WITH SPECIALIST:     3. FOLLOW UP WITH NURSE VISIT:     4. IMAGING STUDIES TO BE SCHEDULED:     5. PROCEDURES TO BE SCHEDULED: Colonoscopy due early in 2018 after on Medicare    Gerald Rowley MD

## 2017-11-29 NOTE — NURSING NOTE
"Chief Complaint   Patient presents with     Panel Management     flu, my chart, lipids, yearly drug screen     Recheck Medication       Initial /58  Pulse 80  Temp 98.7  F (37.1  C) (Oral)  Resp 16  Ht 5' 6\" (1.676 m)  Wt 225 lb (102.1 kg)  SpO2 95%  BMI 36.32 kg/m2 Estimated body mass index is 36.32 kg/(m^2) as calculated from the following:    Height as of this encounter: 5' 6\" (1.676 m).    Weight as of this encounter: 225 lb (102.1 kg).  Medication Reconciliation: complete     Oleksandr Orosco MA    "

## 2017-11-30 PROBLEM — R94.4 DECREASED GFR: Status: ACTIVE | Noted: 2017-11-30

## 2017-11-30 LAB
ALBUMIN SERPL-MCNC: 4.1 G/DL (ref 3.4–5)
ALP SERPL-CCNC: 89 U/L (ref 40–150)
ALT SERPL W P-5'-P-CCNC: 32 U/L (ref 0–70)
ANION GAP SERPL CALCULATED.3IONS-SCNC: 8 MMOL/L (ref 3–14)
AST SERPL W P-5'-P-CCNC: 23 U/L (ref 0–45)
BILIRUB SERPL-MCNC: 0.4 MG/DL (ref 0.2–1.3)
BUN SERPL-MCNC: 18 MG/DL (ref 7–30)
CALCIUM SERPL-MCNC: 8.9 MG/DL (ref 8.5–10.1)
CHLORIDE SERPL-SCNC: 104 MMOL/L (ref 94–109)
CHOLEST SERPL-MCNC: 159 MG/DL
CO2 SERPL-SCNC: 28 MMOL/L (ref 20–32)
CREAT SERPL-MCNC: 1.24 MG/DL (ref 0.66–1.25)
GFR SERPL CREATININE-BSD FRML MDRD: 59 ML/MIN/1.7M2
GLUCOSE SERPL-MCNC: 95 MG/DL (ref 70–99)
HDLC SERPL-MCNC: 61 MG/DL
LDLC SERPL CALC-MCNC: 36 MG/DL
NONHDLC SERPL-MCNC: 98 MG/DL
POTASSIUM SERPL-SCNC: 4.6 MMOL/L (ref 3.4–5.3)
PROT SERPL-MCNC: 7.1 G/DL (ref 6.8–8.8)
PSA SERPL-ACNC: 0.45 UG/L (ref 0–4)
SODIUM SERPL-SCNC: 140 MMOL/L (ref 133–144)
TRIGL SERPL-MCNC: 309 MG/DL

## 2017-12-18 DIAGNOSIS — F41.9 ANXIETY: ICD-10-CM

## 2017-12-18 RX ORDER — LORAZEPAM 1 MG/1
1 TABLET ORAL
Qty: 30 TABLET | Refills: 0 | Status: SHIPPED | OUTPATIENT
Start: 2017-12-18 | End: 2018-01-15

## 2017-12-18 NOTE — TELEPHONE ENCOUNTER
LORazepam (ATIVAN) 1 MG tablet      Last Written Prescription Date:  11/20/2017  Last Fill Quantity: 30,   # refills: 0  Last Office Visit: 11/29/2017  Future Office visit:       Routing refill request to provider for review/approval because:  Drug not on the [facility], [facility] or [facility] refill protocol or controlled substance

## 2017-12-19 DIAGNOSIS — I25.10 CORONARY ARTERY DISEASE INVOLVING NATIVE CORONARY ARTERY OF NATIVE HEART WITHOUT ANGINA PECTORIS: Chronic | ICD-10-CM

## 2017-12-19 RX ORDER — METOPROLOL TARTRATE 25 MG/1
25 TABLET, FILM COATED ORAL 2 TIMES DAILY
Qty: 60 TABLET | Refills: 3 | Status: SHIPPED | OUTPATIENT
Start: 2017-12-19

## 2017-12-19 NOTE — TELEPHONE ENCOUNTER
Hello,  last fill date:11-  Last quantity:60    Thank You,  Regi Avalos  Pharmacy Technician  [facility]  [phone]

## 2018-01-15 DIAGNOSIS — F41.9 ANXIETY: ICD-10-CM

## 2018-01-15 RX ORDER — LORAZEPAM 1 MG/1
1 TABLET ORAL
Qty: 30 TABLET | Refills: 0 | Status: SHIPPED | OUTPATIENT
Start: 2018-01-15 | End: 2018-02-13

## 2018-01-15 NOTE — TELEPHONE ENCOUNTER
Hello,  last fill date:12-  Last quantity:30    Thank You,  Regi Avalos  Pharmacy Technician  [facility]  [phone]

## 2018-01-20 ENCOUNTER — HEALTH MAINTENANCE LETTER (OUTPATIENT)
Age: 63
End: 2018-01-20

## 2018-02-13 DIAGNOSIS — F41.9 ANXIETY: ICD-10-CM

## 2018-02-13 RX ORDER — LORAZEPAM 1 MG/1
1 TABLET ORAL
Qty: 30 TABLET | Refills: 0 | Status: SHIPPED | OUTPATIENT
Start: 2018-02-13 | End: 2018-03-19

## 2018-02-13 NOTE — TELEPHONE ENCOUNTER
Hello,  last fill date:01-  Last quantity:30    Thank You,  Regi Avalos  Pharmacy Technician  [facility]  [phone]

## 2018-03-01 ENCOUNTER — TELEPHONE (OUTPATIENT)
Dept: FAMILY MEDICINE | Facility: CLINIC | Age: 63
End: 2018-03-01

## 2018-03-01 NOTE — TELEPHONE ENCOUNTER
Panel Management Review      Patient has the following on his problem list:    COPD  Diagnosis date: 9/1/2011   Is this diagnosis new within the last year?   NO   Was spirometry completed?  YES      Composite cancer screening  Chart review shows that this patient is due/due soon for the following Colonoscopy  Summary:    Patient is due/failing the following:   BMP and COLONOSCOPY    Action needed:   Patient needs non-fasting lab only appointment and Patient needs referral/order: Colonoscopy    Type of outreach:    Sent letter.    Questions for provider review:    None                                                                                                                                    Soledad Herrera CMA       Chart routed to Care Team .

## 2018-03-01 NOTE — LETTER
[facility]  [address], [address]  [place] [address]  Phone: [phone]  Fax: [phone]  March 1, 2018      Jermaine Quiroga  [address] [address]      Dear Jermaine,    We care about your health and have reviewed your health plan including your medical conditions, medications, and lab results.  Based on this review, it is recommended that you follow up regarding the following health topic(s):  -Colon Cancer Screening    We recommend you take the following action(s):  -schedule a LAB ONLY APPOINTMENT to recheck your: BMP (basic metabolic panel) within the next 1-4 weeks.  If' you have had labs completed eslewhere, please let us know so we can update your records.    -schedule a COLONOSCOPY to look for colon cancer (due every 10 years or 5 years in higher risk situations.)  Colonoscopies can prevent 90-95% of colon cancer deaths.  Problem lesions can be removed before they ever become cancer.  If you do not wish to do a colonoscopy or cannot afford to do one at this time, there is another option called a Fecal Immunochemical Occult Blood Test (FIT) a take home stool sample kit.  It does not replace the colonoscopy for colorectal cancer screening, but it can detect hidden bleeding in the lower colon.  It does need to be repeated every year and if a positive result is obtained, you would be referred for a colonoscopy.  If you have completed either one of these tests at another facility, please have the records sent to our clinic for our records.     Please call us at the [facility] - [phone] (or use CardioDx) to address the above recommendations.     Thank you for trusting [facility]] and we appreciate the opportunity to serve you.  We look forward to supporting your healthcare needs in the future.    Healthy Regards,    Your Health Care Team  [facility]

## 2018-03-19 DIAGNOSIS — F41.9 ANXIETY: ICD-10-CM

## 2018-03-19 RX ORDER — LORAZEPAM 1 MG/1
1 TABLET ORAL
Qty: 30 TABLET | Refills: 0 | Status: SHIPPED | OUTPATIENT
Start: 2018-03-19

## 2018-03-19 NOTE — TELEPHONE ENCOUNTER
Hello,  last fill date:02-  Last quantity:30    Thank You,  Regi Avalos  Pharmacy Technician  [facility]  [phone]

## 2023-07-20 ENCOUNTER — TELEPHONE (OUTPATIENT)
Dept: PULMONOLOGY | Facility: CLINIC | Age: 68
End: 2023-07-20
Payer: COMMERCIAL

## 2023-07-20 NOTE — TELEPHONE ENCOUNTER
Contacted [facility] and requested PFT report from 05/26/2023 be faxed to pulmonary medicine clinic.    Tonia Meeks LPN  Pulmonary Medicine:  [facility]  Phone: [phone] Fax: [phone]

## 2023-07-20 NOTE — TELEPHONE ENCOUNTER
Contacted [facility] imaging dept. Requested CT chest pulm angio 05/03/2023 and CXR 05/01/2023 be pushed to  PACS for review.    Tonia Meeks LPN  Pulmonary Medicine:  [facility]  Phone: [phone] Fax: [phone]
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
## 2023-08-01 ENCOUNTER — OFFICE VISIT (OUTPATIENT)
Dept: PULMONOLOGY | Facility: CLINIC | Age: 68
End: 2023-08-01
Payer: COMMERCIAL

## 2023-08-01 VITALS
OXYGEN SATURATION: 97 % | WEIGHT: 233 LBS | BODY MASS INDEX: 37.61 KG/M2 | HEART RATE: 74 BPM | DIASTOLIC BLOOD PRESSURE: 89 MMHG | SYSTOLIC BLOOD PRESSURE: 145 MMHG

## 2023-08-01 DIAGNOSIS — R06.09 DOE (DYSPNEA ON EXERTION): ICD-10-CM

## 2023-08-01 DIAGNOSIS — K21.9 GASTROESOPHAGEAL REFLUX DISEASE WITHOUT ESOPHAGITIS: ICD-10-CM

## 2023-08-01 DIAGNOSIS — J44.9 CHRONIC OBSTRUCTIVE PULMONARY DISEASE, UNSPECIFIED COPD TYPE (H): Primary | ICD-10-CM

## 2023-08-01 DIAGNOSIS — J96.11 CHRONIC RESPIRATORY FAILURE WITH HYPOXIA (H): ICD-10-CM

## 2023-08-01 DIAGNOSIS — G47.34 NOCTURNAL HYPOXIA: ICD-10-CM

## 2023-08-01 PROCEDURE — 36415 COLL VENOUS BLD VENIPUNCTURE: CPT | Performed by: INTERNAL MEDICINE

## 2023-08-01 PROCEDURE — 99205 OFFICE O/P NEW HI 60 MIN: CPT | Performed by: INTERNAL MEDICINE

## 2023-08-01 PROCEDURE — 82103 ALPHA-1-ANTITRYPSIN TOTAL: CPT | Performed by: INTERNAL MEDICINE

## 2023-08-01 RX ORDER — FLUTICASONE PROPIONATE AND SALMETEROL 50; 500 UG/1; UG/1
POWDER RESPIRATORY (INHALATION)
COMMUNITY
Start: 2023-03-27

## 2023-08-01 RX ORDER — SIMVASTATIN 10 MG
TABLET ORAL
COMMUNITY
Start: 2023-07-06

## 2023-08-01 RX ORDER — METOPROLOL TARTRATE 50 MG
TABLET ORAL
COMMUNITY
Start: 2023-05-05

## 2023-08-01 ASSESSMENT — PAIN SCALES - GENERAL: PAINLEVEL: MILD PAIN (3)

## 2023-08-01 NOTE — PATIENT INSTRUCTIONS
Take morning medications 30-60 minutes before eating   Don't lay down for at least one hour after eating (two hours ideally)  Elevate your chest to help with reflux by either using a wedge pillow or by raising just the head of the bed with bed raisers by 6-8 inches    Google [email or web address] and inhaler technique to review a number of videos

## 2023-08-02 ENCOUNTER — TELEPHONE (OUTPATIENT)
Dept: PULMONOLOGY | Facility: CLINIC | Age: 68
End: 2023-08-02
Payer: COMMERCIAL

## 2023-08-02 NOTE — TELEPHONE ENCOUNTER
Patient contacted pulmonary clinic regarding Echo testing and pulmonary rehab. Per patient, each pulmonary rehab session has a co-pay of $30 that is not covered by Medicare, therefore, patient is refusing to participate. Patient also states that he does not feel as if the Echocardiogram that was ordered by Dr. Alfred is necessary and is requesting to cancel this test. Patient states that he has a cardiologist and a PCP that he will follow up with regarding any cardiac needs.    Echo has been cancelled per patient request. Dr. Alfred notified.    Tonia Meeks LPN  Pulmonary Medicine:  [facility]  Phone: [phone] Fax: [phone]

## 2023-08-03 LAB — A1AT SERPL-MCNC: 146 MG/DL (ref 90–200)

## 2023-08-08 ENCOUNTER — OFFICE VISIT (OUTPATIENT)
Dept: NURSING | Facility: CLINIC | Age: 68
End: 2023-08-08
Payer: COMMERCIAL

## 2023-08-08 ENCOUNTER — MEDICAL CORRESPONDENCE (OUTPATIENT)
Dept: HEALTH INFORMATION MANAGEMENT | Facility: CLINIC | Age: 68
End: 2023-08-08

## 2023-08-08 ENCOUNTER — TRANSFERRED RECORDS (OUTPATIENT)
Dept: HEALTH INFORMATION MANAGEMENT | Facility: CLINIC | Age: 68
End: 2023-08-08

## 2023-08-08 DIAGNOSIS — J96.11 CHRONIC RESPIRATORY FAILURE WITH HYPOXIA (H): ICD-10-CM

## 2023-08-08 PROCEDURE — 94762 N-INVAS EAR/PLS OXIMTRY CONT: CPT | Performed by: INTERNAL MEDICINE

## 2023-08-08 NOTE — PROGRESS NOTES
PFT Lab Note: After being given verbal and written instructions on its use, pt was sent home with overnight oximeter. He will return it tomorrow for download.

## 2023-08-10 PROBLEM — E66.812 CLASS 2 SEVERE OBESITY DUE TO EXCESS CALORIES WITH SERIOUS COMORBIDITY IN ADULT (H): Status: ACTIVE | Noted: 2023-08-10

## 2023-08-10 PROBLEM — E66.01 CLASS 2 SEVERE OBESITY DUE TO EXCESS CALORIES WITH SERIOUS COMORBIDITY IN ADULT (H): Status: ACTIVE | Noted: 2023-08-10

## 2023-08-10 NOTE — PROGRESS NOTES
SHAWN on RA showed elevated LETICIA-16 and had 72 minutes of desaturation.  Repeating SHAWN on 2 LPM    Dexter Alfred MD  [facility],  of Medicine  Pulmonary/Critical Care Medicine  Pager: [phone]  August 10, 2023

## 2023-08-11 NOTE — PROCEDURES
OVERNIGHT OXIMETRY INTERPRETATION     I reviewed the overnight oximetry testing results from 8/8/2023 done on room air.  Analysis time:  9 Hours 26 Minutes.  Time at or below 88% oxygen saturation 72 Minutes.  Lowest oxygen saturation 79 %  Oxygen desaturation index 16.1  Significant hypoxemia with desaturation pattern that could be consistent with obstructive versus central sleep apnea.   I recommend considering further comprehensive sleep evaluation.    Radha Grimes M.D.  Pulmonary/Critical Care/Sleep Medicine

## 2023-08-15 ENCOUNTER — TELEPHONE (OUTPATIENT)
Dept: PULMONOLOGY | Facility: CLINIC | Age: 68
End: 2023-08-15
Payer: COMMERCIAL

## 2023-08-15 NOTE — TELEPHONE ENCOUNTER
Voicemail left for patient requesting a call back to pulmonary clinic to discuss SHAWN results and next steps. Will await call back.    Per Dr. Alfred:    Dexter Alfred MD BARRIE, HABIBA  The SHAWN for him showed an LETICIA of 16.1 and he spent 72 minutes with a low O2 while sleeping. The SHAWN was done on room air.  Can you let him that we need to repeat the study but with him using 2 LPM throughout the night.      Tonia Meeks LPN  Pulmonary Medicine:  [facility]  Phone: [phone] Fax: [phone]

## 2023-08-17 DIAGNOSIS — G47.34 NOCTURNAL HYPOXIA: Primary | ICD-10-CM

## 2023-08-17 NOTE — TELEPHONE ENCOUNTER
"Spoke with patient regarding SHAWN results. Patient states that he believes it was \"silly\" to test him on RA, as he was already aware that he requires O2 overnight. Patient is agreeable to retesting the SHAWN study on 2L to make sure the 2L is sufficient. SHAWN orders have been faxed to [facility] F: [phone].    Inquired about pulmonary rehab at an outside facility such as [facility] or [facility], as [facility] was charging a co-pay fee. Patient is adamant that he does not want to participate in pulmonary rehab. Dr. Alfred updated.    Will await completed SHAWN report for next steps.      Tonia Meeks LPN  Pulmonary Medicine:  [facility]  Phone: [phone] Fax: [phone]    "

## 2023-08-19 ENCOUNTER — HEALTH MAINTENANCE LETTER (OUTPATIENT)
Age: 68
End: 2023-08-19

## 2023-08-23 ENCOUNTER — TRANSFERRED RECORDS (OUTPATIENT)
Dept: HEALTH INFORMATION MANAGEMENT | Facility: CLINIC | Age: 68
End: 2023-08-23
Payer: COMMERCIAL

## 2023-11-05 ENCOUNTER — MYC REFILL (OUTPATIENT)
Dept: FAMILY MEDICINE | Facility: CLINIC | Age: 68
End: 2023-11-05
Payer: COMMERCIAL

## 2023-11-05 DIAGNOSIS — F41.9 ANXIETY: ICD-10-CM

## 2023-11-06 RX ORDER — LORAZEPAM 1 MG/1
1 TABLET ORAL
Qty: 30 TABLET | Refills: 0 | OUTPATIENT
Start: 2023-11-06

## 2024-10-12 ENCOUNTER — HEALTH MAINTENANCE LETTER (OUTPATIENT)
Age: 69
End: 2024-10-12